# Patient Record
Sex: MALE | Race: WHITE | NOT HISPANIC OR LATINO | URBAN - METROPOLITAN AREA
[De-identification: names, ages, dates, MRNs, and addresses within clinical notes are randomized per-mention and may not be internally consistent; named-entity substitution may affect disease eponyms.]

---

## 2022-07-28 ENCOUNTER — OFFICE VISIT (OUTPATIENT)
Dept: NEUROLOGY | Facility: MEDICAL CENTER | Age: 52
End: 2022-07-28
Attending: PSYCHIATRY & NEUROLOGY
Payer: OTHER GOVERNMENT

## 2022-07-28 VITALS
OXYGEN SATURATION: 94 % | WEIGHT: 217.81 LBS | SYSTOLIC BLOOD PRESSURE: 130 MMHG | DIASTOLIC BLOOD PRESSURE: 90 MMHG | RESPIRATION RATE: 14 BRPM | HEART RATE: 72 BPM | TEMPERATURE: 98 F

## 2022-07-28 DIAGNOSIS — R20.0 NUMBNESS OF RIGHT LOWER EXTREMITY: Primary | ICD-10-CM

## 2022-07-28 PROCEDURE — 99212 OFFICE O/P EST SF 10 MIN: CPT | Performed by: PSYCHIATRY & NEUROLOGY

## 2022-07-28 PROCEDURE — 99203 OFFICE O/P NEW LOW 30 MIN: CPT | Performed by: PSYCHIATRY & NEUROLOGY

## 2022-07-28 RX ORDER — ACYCLOVIR 400 MG/1
TABLET ORAL
COMMUNITY
Start: 2022-05-05

## 2022-07-28 RX ORDER — DOXYCYCLINE HYCLATE 100 MG
TABLET ORAL
COMMUNITY
Start: 2022-07-26

## 2022-07-28 RX ORDER — ASPIRIN 81 MG/1
81 TABLET, CHEWABLE ORAL DAILY
COMMUNITY

## 2022-07-28 RX ORDER — ACYCLOVIR 50 MG/G
OINTMENT TOPICAL
COMMUNITY
Start: 2022-05-13

## 2022-07-28 RX ORDER — CLINDAMYCIN PHOSPHATE 10 UG/ML
LOTION TOPICAL
COMMUNITY
Start: 2022-07-26

## 2022-07-28 RX ORDER — LISINOPRIL 20 MG/1
TABLET ORAL
COMMUNITY
Start: 2022-05-26

## 2022-07-28 ASSESSMENT — PATIENT HEALTH QUESTIONNAIRE - PHQ9: CLINICAL INTERPRETATION OF PHQ2 SCORE: 0

## 2022-07-28 NOTE — PROGRESS NOTES
"[facility] NEUROLOGY  GENERAL NEUROLOGY  NEW PATIENT VISIT    Referral source: Bubba Farmer MD    CC: \"lesion of medial popliteal nerve, right lower limb\"    HISTORY OF ILLNESS:  Brad Castro Jr. is a 51 y.o. man with a history of hypertension.  Today, he was unaccompanied, and he provided the following history:    2021:  Brad developed numbness over there medial surface of the right foot.  The lateral surface is occasionally involved as well.  Symptoms occur periodically and are provoked by swelling at the knee.  Activity (e.g., riding a bike) can also provoke symptoms.  If he avoids certain activities symptoms will occur about once every ~2 weeks.  Symptoms can persist for ~30 minutes at a time.  This is not associated with weakness of the toes or the ankle.    MEDICAL AND SURGICAL HISTORY:  Past Medical History:   Diagnosis Date   • Degeneration of meniscus of right knee    • Hypertension      No past surgical history on file.     MEDICATIONS:  Current Outpatient Medications   Medication Sig   • lisinopril (PRINIVIL) 20 MG Tab    • doxycycline (VIBRAMYCIN) 100 MG Tab    • acyclovir (ZOVIRAX) 400 MG tablet    • acyclovir (ZOVIRAX) 5 % Ointment    • CLINDAMYCIN PHOSPHATE,TOPICAL, 1 % Lotion    • aspirin (ASA) 81 MG Chew Tab chewable tablet Chew 81 mg every day.     SOCIAL HISTORY:  Social History     Tobacco Use   • Smoking status: Former Smoker   • Smokeless tobacco: Never Used   Substance Use Topics   • Alcohol use: Not on file     Social History     Social History Narrative   • Not on file     FAMILY HISTORY:  Family History   Problem Relation Age of Onset   • Other Father 79        brain tumor     REVIEW OF SYSTEMS:  A ROS was completed.  Pertinent positives and negatives were included in the HPI, above.  All other systems were reviewed and are negative.    PHYSICAL EXAM:  General/Medical:  - NAD  - hair, skin, nails, and joints were normal    Neuro:  MENTAL STATUS: awake and alert; no deficits of speech " or language; oriented to conversation; affect was appropriate to situation; pleasant, cooeprative    CRANIAL NERVES:    II: acuity: J1+/J1, fields: intact to confrontation, pupils: 3/3 to 2/2 without a relative afferent pupillary defect, discs: sharp    III/IV/VI: versions: intact without nystagmus    V: facial sensation: symmetric to light touch    VII: facial expression: symmetric    VIII: hearing: intact to finger rub    IX/X: palate: elevates symmetrically    XI: shoulder shrug: symmetric    XII: tongue: midline    MOTOR:  - bulk: normal throughout  - tone: normal in the upper extremities  Upper Extremity Strength  (R/L)    5/5   Elbow flexion 5/5   Elbow extension 5/5   Shoulder abduction 5/5     Lower Extremity Strength  (R/L)   Hip flexion 5/5   Knee extension 5/5   Knee flexion 5/5   Ankle plantarflexion 5/5   Ankle dorsiflexion 5/5     - can walk on toes and heels  - pronator drift: absent  - abnormal movements: none    SENSATION:  - light touch: grossly intact over the upper and lower extremities  - vibration (R/L, seconds): 11/13 at the great toes  - pinprick: NT  - proprioception: NT  - Romberg: absent    COORDINATION:  - finger to nose: normal, no ataxia on exam  - finger tapping: rapid and accurate, bilaterally    REFLEXES:  Reflex Right Left   BR 2+ 2+   Biceps 2+ 2+   Triceps 2+ 2+   Patellae 2+ 2+   Achilles 2+ 2+   Toes down down     GAIT:  - narrow base and normal  - heel-raised/toe-raised gait: intact/intact  - tandem gait: intact    REVIEW OF IMAGING STUDIES:  No brain imaging available.    REVIEW OF LABORATORY STUDIES:  No data available.    ASSESSMENT:  Brad Castro Jr. is a 51 y.o. man with episodic numbness over the medial/lateral foot and a history of HTN.  Neurologic exam today, including strength, sensory (pinprick, light touch, vibration sensation), and reflexes, was normal.  Plan for NCS per patient request.    PLAN:  Episodic Foot Numbness:  - NCS    Follow-Up:  - Return if  "symptoms worsen or fail to improve.    Signed: Con Morton M.D.    BILLING DOCUMENTATION:   I spent 32 minutes reviewing the medical record, interviewing and examining the patient, discussing my impression (see \"assessment\" above), and coordinating care.  "

## 2022-09-02 ENCOUNTER — NON-PROVIDER VISIT (OUTPATIENT)
Dept: NEUROLOGY | Facility: MEDICAL CENTER | Age: 52
End: 2022-09-02
Attending: SPECIALIST
Payer: OTHER GOVERNMENT

## 2022-09-02 DIAGNOSIS — R20.0 NUMBNESS OF RIGHT LOWER EXTREMITY: ICD-10-CM

## 2022-09-02 PROCEDURE — 95886 MUSC TEST DONE W/N TEST COMP: CPT | Performed by: SPECIALIST

## 2022-09-02 PROCEDURE — 95886 MUSC TEST DONE W/N TEST COMP: CPT | Mod: 26 | Performed by: SPECIALIST

## 2022-09-02 PROCEDURE — 95908 NRV CNDJ TST 3-4 STUDIES: CPT | Performed by: SPECIALIST

## 2022-09-02 PROCEDURE — 95908 NRV CNDJ TST 3-4 STUDIES: CPT | Mod: 26 | Performed by: SPECIALIST

## 2022-09-02 NOTE — PROCEDURES
NERVE CONDUCTION STUDIES AND ELECTROMYOGRAPHY REPORT        09/02/22      Referring provider: No primary care provider on file.      SUMMARY OF PATIENT'S CLINICAL HISTORY,PHYSICAL EXAM, AND RATIONALE FOR TESTING:    Mr. Brad Castro Jr. 51 y.o. presenting with intermittent right foot numbness typically presenting when the patient exercises.  He relates it to swelling and pressure on his knee.    Past Medical History is significant for :   Past Medical History:   Diagnosis Date    Degeneration of meniscus of right knee     Hypertension        A brief general examination was remarkable a negative Tinel's sign over the right peroneal nerve at the fibular head.    The electrodiagnostic studies were performed to evaluate for possible peripheral neuropathy versus radiculopathy.      ELECTRODIAGNOSTIC EXAMINATION:  Nerve conduction studies (NCS) and electromyography (EMG) are utilized to evaluate direct or indirect damage to the peripheral nervous system. NCS are performed to measure the nerve(s) response(s) to electrostimulation across a given nerve segment. EMG evaluates the passive and active electrical activity of the muscle(s) in question.  Muscles are innervated by specific peripheral nerves and roots. Often times, several nerves the muscle to be examined in order to determine the presence or absence of the disease process. Furthermore, nerves and muscles may need to be tested in a [id number] comparison, as well as in additional extremities, as this may be crucial in characterizing the extent of the disease process, which may be diffuse or isolated and of varying degree of severity. The extent of the neurodiagnostic exam is justified as it may help arrive to a proper diagnosis, which ultimately may contribute to better management of the patient. Therefore, the nerves to muscles examined during the study were medically necessary.    Unless otherwise noted, temperature of the extremity(s) study was monitored  before and during the examination and remained between 32 and 36 degrees C for the upper extremities, and between 30 and 36 degrees C for the lower extremities.      NERVE CONDUCTION STUDIES:  Sensory nerves:   -Right sural nerve was tested. The response was within normal limits.    Motor nerves:   -Right tibial nerve was examined. Recording electrodes placed at the Abductor Hallucis muscles. The response was within normal limits.  -Right deep Peroneal motor nerve was examined. Recording electrodes were placed at the Extensor Digitorum Brevis muscles.The response was within normal limits.     No temporal dispersion or conduction block observed in any of the motor nerves examined.        ELECTROMYOGRAPHY:  The study was performed the concentric needle electrode. Fibrillation and fasciculation activity is graded by convention from none (0) to continuous (4+).  Needle electrode examination was performed in the following muscles: Right vastus lateralis, tibialis anterior, gastrocnemius, extensor digitorum brevis, abductor hallucis.  The muscles tested demonstrated normal insertional activity, normal motor unit morphology and recruitment. There were no elements suggestive of active denervation.      Nerve Conduction Studies     Stim Site NR Onset (ms) Norm Onset (ms) O-P Amp (µV) Norm O-P Amp Site1 Site2 Delta-P (ms) Dist (cm) [place] (m/s) Norm [place] (m/s)   Right Sural Anti Sensory (Lat Mall)   Calf    3.1 <4.6 13.6 >4 Calf Lat Mall 4.1 14.0 *34 >40        Stim Site NR Onset (ms) Norm Onset (ms) O-P Amp (mV) Norm O-P Amp Site1 Site2 Delta-0 (ms) Dist (cm) [place] (m/s) Norm [place] (m/s)   Right Peroneal EDB Motor (Ext Dig Brev)   Ankle    3.6 <6 5.3 >2.5 B Fib Ankle 7.7 38.0 49 >40   B Fib    11.3  5.1  Poplt B Fib 1.6 10.0 63    Poplt    12.9  5.3          Right Tibial Motor (Abd Villalta Brev)   Ankle    5.5 <6 13.1 >4 Knee Ankle 7.5 44.5 59 >40   Knee    13.0  12.3                   Electromyography     Side Muscle Nerve Root Ins  Act Fibs Psw Amp Dur Poly Recrt Int Pat Comment   Right VastusLat Femoral L2-4 Nml Nml Nml Nml Nml 0 Nml Nml    Right AntTibialis Dp Br Fibular L4-5 Nml Nml Nml Nml Nml 0 Nml Nml    Right Gastroc Tibial S1-2 Nml Nml Nml Nml Nml 0 Nml Nml    Right Ext Dig Brev Dp Br Fibular L5, S1 Nml Nml Nml Nml Nml 0 Nml Nml    Right AbdHallucis MedPlantar S1-2 Nml Nml Nml Nml Nml 0 Nml Nml              DIAGNOSTIC INTERPRETATION:   Extensive electrodiagnostic studies were performed to the right lower extremity.  The results are as follows:    1.  Normal EMG and nerve conduction study right lower extremity.  Specifically, right tibial and peroneal motor and sural sensory conduction studies were within normal limits and there were no acute or chronic denervation changes in selected muscles studied in the right lower extremity.  There was no evidence of peripheral neuropathy plexopathy or radiculopathy.      CLINICAL DISCUSSION:   Patient's symptoms seem most consistent with an intermittent peroneal neuropathy but the peroneal nerve was normal electrophysiologically.  The patient was asymptomatic on the left side and elected not to have the left side studied.      JEANNETTE Valverde M.D. (No note.)

## 2023-10-27 ENCOUNTER — OFFICE VISIT (OUTPATIENT)
Dept: URGENT CARE | Facility: CLINIC | Age: 53
End: 2023-10-27
Payer: OTHER GOVERNMENT

## 2023-10-27 ENCOUNTER — HOSPITAL ENCOUNTER (EMERGENCY)
Facility: MEDICAL CENTER | Age: 53
End: 2023-10-27
Attending: STUDENT IN AN ORGANIZED HEALTH CARE EDUCATION/TRAINING PROGRAM
Payer: OTHER MISCELLANEOUS

## 2023-10-27 ENCOUNTER — APPOINTMENT (OUTPATIENT)
Dept: RADIOLOGY | Facility: MEDICAL CENTER | Age: 53
End: 2023-10-27
Attending: STUDENT IN AN ORGANIZED HEALTH CARE EDUCATION/TRAINING PROGRAM
Payer: OTHER MISCELLANEOUS

## 2023-10-27 ENCOUNTER — APPOINTMENT (OUTPATIENT)
Dept: RADIOLOGY | Facility: IMAGING CENTER | Age: 53
End: 2023-10-27
Payer: OTHER GOVERNMENT

## 2023-10-27 VITALS
BODY MASS INDEX: 30.87 KG/M2 | OXYGEN SATURATION: 97 % | HEIGHT: 74 IN | TEMPERATURE: 97.5 F | RESPIRATION RATE: 16 BRPM | DIASTOLIC BLOOD PRESSURE: 92 MMHG | HEART RATE: 65 BPM | WEIGHT: 240.52 LBS | SYSTOLIC BLOOD PRESSURE: 169 MMHG

## 2023-10-27 VITALS
SYSTOLIC BLOOD PRESSURE: 156 MMHG | HEART RATE: 62 BPM | OXYGEN SATURATION: 98 % | BODY MASS INDEX: 31.06 KG/M2 | HEIGHT: 74 IN | TEMPERATURE: 98.3 F | DIASTOLIC BLOOD PRESSURE: 82 MMHG | RESPIRATION RATE: 16 BRPM | WEIGHT: 242 LBS

## 2023-10-27 DIAGNOSIS — M54.50 ACUTE BILATERAL LOW BACK PAIN WITHOUT SCIATICA: ICD-10-CM

## 2023-10-27 DIAGNOSIS — S32.010A CLOSED WEDGE COMPRESSION FRACTURE OF L1 VERTEBRA, INITIAL ENCOUNTER (HCC): ICD-10-CM

## 2023-10-27 DIAGNOSIS — M54.2 ACUTE NECK PAIN: ICD-10-CM
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
## 2023-10-27 DIAGNOSIS — S32.2XXA CLOSED FRACTURE OF COCCYX, INITIAL ENCOUNTER (HCC): ICD-10-CM

## 2023-10-27 PROCEDURE — 72131 CT LUMBAR SPINE W/O DYE: CPT

## 2023-10-27 PROCEDURE — 3077F SYST BP >= 140 MM HG: CPT

## 2023-10-27 PROCEDURE — 99205 OFFICE O/P NEW HI 60 MIN: CPT

## 2023-10-27 PROCEDURE — 99283 EMERGENCY DEPT VISIT LOW MDM: CPT | Mod: 25

## 2023-10-27 PROCEDURE — 72220 X-RAY EXAM SACRUM TAILBONE: CPT | Mod: TC,FY | Performed by: RADIOLOGY

## 2023-10-27 PROCEDURE — 3079F DIAST BP 80-89 MM HG: CPT

## 2023-10-27 ASSESSMENT — ENCOUNTER SYMPTOMS
MYALGIAS: 0
WEIGHT LOSS: 0
CHILLS: 0
BACK PAIN: 1
FEVER: 0
FALLS: 0
TINGLING: 0
ABDOMINAL PAIN: 0
WEAKNESS: 0
SHORTNESS OF BREATH: 0
HEADACHES: 0
NECK PAIN: 1
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
## 2023-10-28 NOTE — ED NOTES
Bedside report received from off going RN: Gisele, assumed care of patient.  POC discussed with patient. Call light within reach, all needs addressed at this time.       Fall risk interventions in place: Move the patient closer to the nurse's station and Patient's personal possessions are with in their safe reach (all applicable per [facility] Fall risk assessment)   Continuous monitoring: Pulse Ox or Blood Pressure  IVF/IV medications: Not Applicable   Oxygen: Room Air  Bedside sitter: Not Applicable   Isolation: Not Applicable

## 2023-10-28 NOTE — DISCHARGE INSTRUCTIONS
As we discussed the CT showed you did break your tailbone although it is nondisplaced and these were do not require any management.  You also have a small compression fracture in your L1 lumbar vertebrae so we are going to place you in a TLSO brace for pain and support.  We would like you to follow-up with neurosurgery for this.    Take the following medications for pain/fever at home:  Acetaminophen (Tylenol): Take 650 mg (2 regular strength) every 6 hours. Do not take more than 3,000mg in a 24 hour period.   Ibuprofen: Take 400-600 mg (2-3 regular strength) every 6 hours. Take with food.   Alternate the two medications and you can take one of them every 3 hours.       Return to the emergency department if you develop any focal numbness, weakness, bowel or bladder loss of continence, or other concerns.

## 2023-10-28 NOTE — ED NOTES
TLSO brace applied; Pt educated Pt on brace use and care. Pt verbalized understanding.    Patient educated on discharge instructions, follow up appointments, prescriptions, and home care. Patient verbalized understanding. Patient ambulated well to [facility].